# Patient Record
Sex: FEMALE | Employment: OTHER | URBAN - METROPOLITAN AREA
[De-identification: names, ages, dates, MRNs, and addresses within clinical notes are randomized per-mention and may not be internally consistent; named-entity substitution may affect disease eponyms.]

---

## 2018-04-17 NOTE — PATIENT DISCUSSION
Patient will return to clinic when migraine is not active to recheck mr for new glasses at no charge.  Patient informed on presbyopia and how eyestrain may affect headaches.

## 2021-10-27 ENCOUNTER — PREPPED CHART (OUTPATIENT)
Dept: URBAN - METROPOLITAN AREA CLINIC 46 | Facility: CLINIC | Age: 61
End: 2021-10-27